# Patient Record
Sex: FEMALE | Race: WHITE | NOT HISPANIC OR LATINO | Employment: UNEMPLOYED | RURAL
[De-identification: names, ages, dates, MRNs, and addresses within clinical notes are randomized per-mention and may not be internally consistent; named-entity substitution may affect disease eponyms.]

---

## 2022-08-23 ENCOUNTER — HOSPITAL ENCOUNTER (EMERGENCY)
Facility: HOSPITAL | Age: 32
Discharge: HOME | End: 2022-08-23

## 2022-08-23 VITALS
TEMPERATURE: 98 F | HEART RATE: 88 BPM | WEIGHT: 199 LBS | HEIGHT: 67 IN | DIASTOLIC BLOOD PRESSURE: 90 MMHG | SYSTOLIC BLOOD PRESSURE: 141 MMHG | RESPIRATION RATE: 18 BRPM | BODY MASS INDEX: 31.23 KG/M2 | OXYGEN SATURATION: 100 %
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
## 2022-08-23 DIAGNOSIS — S90.01XA CONTUSION OF RIGHT ANKLE, INITIAL ENCOUNTER: Primary | ICD-10-CM

## 2022-08-23 PROCEDURE — 99285 EMERGENCY DEPT VISIT HI MDM: CPT | Mod: 25

## 2022-08-23 PROCEDURE — 99284 PR EMERGENCY DEPT VISIT,LEVEL IV: ICD-10-PCS | Mod: ,,, | Performed by: NURSE PRACTITIONER

## 2022-08-23 PROCEDURE — 96372 THER/PROPH/DIAG INJ SC/IM: CPT

## 2022-08-23 PROCEDURE — 99284 EMERGENCY DEPT VISIT MOD MDM: CPT | Mod: ,,, | Performed by: NURSE PRACTITIONER

## 2022-08-23 PROCEDURE — 63600175 PHARM REV CODE 636 W HCPCS: Performed by: NURSE PRACTITIONER

## 2022-08-23 RX ORDER — IBUPROFEN 800 MG/1
800 TABLET ORAL 3 TIMES DAILY
Qty: 20 TABLET | Refills: 0 | Status: SHIPPED | OUTPATIENT
Start: 2022-08-23

## 2022-08-23 RX ORDER — KETOROLAC TROMETHAMINE 30 MG/ML
60 INJECTION, SOLUTION INTRAMUSCULAR; INTRAVENOUS
Status: COMPLETED | OUTPATIENT
Start: 2022-08-23 | End: 2022-08-23

## 2022-08-23 RX ORDER — DEXTROAMPHETAMINE SACCHARATE, AMPHETAMINE ASPARTATE MONOHYDRATE, DEXTROAMPHETAMINE SULFATE AND AMPHETAMINE SULFATE 3.75; 3.75; 3.75; 3.75 MG/1; MG/1; MG/1; MG/1
15 CAPSULE, EXTENDED RELEASE ORAL 2 TIMES DAILY
COMMUNITY

## 2022-08-23 RX ADMIN — KETOROLAC TROMETHAMINE 60 MG: 60 INJECTION, SOLUTION INTRAMUSCULAR at 08:08

## 2022-08-24 NOTE — ED PROVIDER NOTES
Encounter Date: 8/23/2022       History     Chief Complaint   Patient presents with    Fall     Patient presents to ER with complaint of a fall.  Patient states she was going down her steps and slipped.  She states she had on scandals and the steps were wet.  She states she was able to get up but ended up slipping again causing pain to her right ankle.  She states ankle immediately started to swell.  She states her right ankle is now painful to bear weight.  She has history of fracture in same ankle as a child.  Denies head injury or LOC, no other injuries reported.     The history is provided by the patient. No  was used.     Review of patient's allergies indicates:   Allergen Reactions    Diphenhydramine     Demerol [meperidine] Nausea Only     Past Medical History:   Diagnosis Date    ADHD      History reviewed. No pertinent surgical history.  History reviewed. No pertinent family history.  Social History     Tobacco Use    Smoking status: Current Every Day Smoker     Types: Cigarettes    Smokeless tobacco: Never Used   Substance Use Topics    Alcohol use: Yes    Drug use: Yes     Types: Marijuana     Review of Systems   Musculoskeletal: Positive for arthralgias, joint swelling and myalgias.        Right ankle pain and edema after fall.   All other systems reviewed and are negative.      Physical Exam     Initial Vitals [08/23/22 1758]   BP Pulse Resp Temp SpO2   (!) 141/90 88 18 98.3 °F (36.8 °C) 100 %      MAP       --         Physical Exam    Nursing note and vitals reviewed.  Constitutional: She appears well-developed and well-nourished.   HENT:   Head: Normocephalic.   Right Ear: External ear normal.   Left Ear: External ear normal.   Nose: Nose normal.   Mouth/Throat: Oropharynx is clear and moist.   Eyes: Conjunctivae and EOM are normal. Pupils are equal, round, and reactive to light.   Neck: Neck supple.   Normal range of motion.  Cardiovascular: Normal rate, regular rhythm,  normal heart sounds and intact distal pulses.   Pulmonary/Chest: Breath sounds normal.   Abdominal: Abdomen is soft. Bowel sounds are normal.   Musculoskeletal:         General: Tenderness and edema present.      Cervical back: Normal range of motion and neck supple.      Comments: Right ankle edema and limited ROM due to pain. Bruising and deformity noted to right lateral ankle.      Neurological: She is alert and oriented to person, place, and time. She has normal strength. GCS score is 15. GCS eye subscore is 4. GCS verbal subscore is 5. GCS motor subscore is 6.   Skin: Skin is warm and dry. Capillary refill takes less than 2 seconds.   Psychiatric: She has a normal mood and affect. Her behavior is normal. Judgment and thought content normal.         Medical Screening Exam   See Full Note    ED Course   Procedures  Labs Reviewed - No data to display       Imaging Results          CT Ankle (Including Hindfoot) Without Contrast Right (Final result)  Result time 08/23/22 19:40:17    Final result by Aron De Oliveira DO (08/23/22 19:40:17)                 Impression:      No acute fracture or dislocation      Electronically signed by: Aron De Oliveira  Date:    08/23/2022  Time:    19:40             Narrative:    EXAMINATION:  CT ANKLE (INCLUDING HINDFOOT) WITHOUT CONTRAST RIGHT    CLINICAL HISTORY:  Fracture, ankle;    TECHNIQUE:  CT ANKLE (INCLUDING HINDFOOT) WITHOUT CONTRAST RIGHT    COMPARISON:  08/23/2022    FINDINGS:  No acute fracture or dislocation.    Mild degenerative change.  No radiopaque foreign bodies.    Edema lateral to the fibula.                               X-Ray Ankle Complete Right (Final result)  Result time 08/23/22 18:45:34    Final result by Aron De Oliveira DO (08/23/22 18:45:34)                 Impression:      No acute findings      Electronically signed by: Aron De Oliveira  Date:    08/23/2022  Time:    18:45             Narrative:    EXAMINATION:  XR ANKLE COMPLETE 3 VIEW  RIGHT    CLINICAL HISTORY:  Unspecified fall, initial encounter    TECHNIQUE:  XR ANKLE COMPLETE 3 VIEW RIGHT    COMPARISON:  None    FINDINGS:  Questionable bony fragment within the inferior aspect of the lateral clear space, correlate with need for CT.    No joint abnormality.    No radiopaque foreign bodies.    Lateral the swelling of the lateral ankle.                                 Medications   ketorolac injection 60 mg (60 mg Intramuscular Given 8/23/22 2007)                       Clinical Impression:   Final diagnoses:  [W19.XXXA] Fall  [S90.01XA] Contusion of right ankle, initial encounter (Primary)          ED Disposition Condition    Discharge Stable        ED Prescriptions     Medication Sig Dispense Start Date End Date Auth. Provider    ibuprofen (ADVIL,MOTRIN) 800 MG tablet Take 1 tablet (800 mg total) by mouth 3 (three) times daily. 20 tablet 8/23/2022  DEAN Solano        Follow-up Information    None          DEAN Solano  08/23/22 [id number]

## 2022-09-02 ENCOUNTER — TELEPHONE (OUTPATIENT)
Dept: OPHTHALMOLOGY | Facility: CLINIC | Age: 32
End: 2022-09-02

## 2022-09-08 ENCOUNTER — OFFICE VISIT (OUTPATIENT)
Dept: ORTHOPEDICS | Facility: CLINIC | Age: 32
End: 2022-09-08

## 2022-09-08 DIAGNOSIS — M76.70 PERONEAL TENDINITIS, UNSPECIFIED LATERALITY: Primary | ICD-10-CM

## 2022-09-08 DIAGNOSIS — M25.871 ANKLE IMPINGEMENT SYNDROME, RIGHT: ICD-10-CM

## 2022-09-08 DIAGNOSIS — S90.01XA CONTUSION OF RIGHT ANKLE, INITIAL ENCOUNTER: ICD-10-CM

## 2022-09-08 PROCEDURE — 99203 OFFICE O/P NEW LOW 30 MIN: CPT | Mod: ,,, | Performed by: ORTHOPAEDIC SURGERY

## 2022-09-08 PROCEDURE — 99203 PR OFFICE/OUTPT VISIT, NEW, LEVL III, 30-44 MIN: ICD-10-PCS | Mod: ,,, | Performed by: ORTHOPAEDIC SURGERY

## 2022-09-08 NOTE — PROGRESS NOTES
HPI:   Shirley Rose is a pleasant 32 y.o. patient who reports to clinic for evaluation of right ankle injury. She slipped on her steps at home, had right ankle pain. She was seen in the ED, placed in a boot and referred to ortho. The pain is on the lateral side of her ankle.   Injury onset and description: fall  Patient's occupation:   This is not a work related injury.   This injury has been non-responsive to conservative care. The pain is worse with repetitive use, and strenuous activity is very difficult.  her pain improves with rest.  she rates pain as a  4/10on the Visual Analog Scale.        PAST MEDICAL HISTORY:   Past Medical History:   Diagnosis Date    ADHD      PAST SURGICAL HISTORY:   No past surgical history on file.  MEDICATIONS:    Current Outpatient Medications:     dextroamphetamine-amphetamine (ADDERALL XR) 15 MG 24 hr capsule, Take 15 mg by mouth 2 (two) times a day., Disp: , Rfl:     ibuprofen (ADVIL,MOTRIN) 800 MG tablet, Take 1 tablet (800 mg total) by mouth 3 (three) times daily., Disp: 20 tablet, Rfl: 0  ALLERGIES:   Review of patient's allergies indicates:   Allergen Reactions    Diphenhydramine     Demerol [meperidine] Nausea Only     REVIEW OF SYSTEMS:  Constitution: Negative. Negative for chills, fever and night sweats. HENT: Negative for congestion and headaches.  Eyes: Negative for blurred vision, left vision loss and right vision loss. Cardiovascular: Negative for chest pain and syncope. Respiratory: Negative for cough and shortness of breath.  Endocrine: Negative for polydipsia, polyphagia and polyuria. Hematologic/Lymphatic: Negative for bleeding problem. Does not bruise/bleed easily. Skin: Negative for dry skin, itching and rash.   Musculoskeletal: Negative for falls. Positive for hand pain and muscle weakness.     PHYSICAL EXAM:  VITAL SIGNS: LMP 08/16/2022   General: Well-developed well-nourished 32 y.o. femalein no acute distress;Cardiovascular: Regular rhythm by  palpation of distal pulse, normal color and temperature, no concerning varicosities on symptomatic side Lungs: No labored breathing or wheezing appreciated Neuro: Alert and oriented ×3 Psychiatric: well oriented to person, place and time, demonstrates normal mood and affect Skin: No rashes, lesions or ulcers, normal temperature, turgor, and texture on uninvolved extremity    Ortho/SPM Exam  I examined her right ankle today.  She has tenderness to palpation over the course of the peroneal tendons.  No overt subluxation of the peroneal tendons demonstrated today.  Pain with eversion inversion.  Negative anterior drawer neutral and plantar flexion.  Mild tenderness of the hindfoot.  Normal alignment of the hindfoot and forefoot is demonstrated.    IMAGING:  X-Ray Ankle Complete Right    Result Date: 8/23/2022  EXAMINATION: XR ANKLE COMPLETE 3 VIEW RIGHT CLINICAL HISTORY: Unspecified fall, initial encounter TECHNIQUE: XR ANKLE COMPLETE 3 VIEW RIGHT COMPARISON: None FINDINGS: Questionable bony fragment within the inferior aspect of the lateral clear space, correlate with need for CT. No joint abnormality. No radiopaque foreign bodies. Lateral the swelling of the lateral ankle.     No acute findings Electronically signed by: Aron De Oliveira Date:    08/23/2022 Time:    18:45    CT Ankle (Including Hindfoot) Without Contrast Right    Result Date: 8/23/2022  EXAMINATION: CT ANKLE (INCLUDING HINDFOOT) WITHOUT CONTRAST RIGHT CLINICAL HISTORY: Fracture, ankle; TECHNIQUE: CT ANKLE (INCLUDING HINDFOOT) WITHOUT CONTRAST RIGHT COMPARISON: 08/23/2022 FINDINGS: No acute fracture or dislocation. Mild degenerative change.  No radiopaque foreign bodies. Edema lateral to the fibula.     No acute fracture or dislocation Electronically signed by: Aron De Oliveira Date:    08/23/2022 Time:    19:40        ASSESSMENT:      ICD-10-CM ICD-9-CM   1. Peroneal tendinitis, unspecified laterality  M76.70 726.79   2. Ankle impingement syndrome,  right  M25.871 726.79       PLAN:     -Findings and treatment options were discussed with the patient  -All questions answered  She appears to have a retained loose body this is related to a previous injury in her childhood.  The most acute issue is peroneal tendinitis secondary to a bad ankle sprain.  Will treat with a lace-up ankle support.  Understands that if her pain is no better in about 2 months this likely is result of a significant peroneal tendon tear this may require an MRI and further treatment.  DC cam boot continue activity as tolerated    There are no Patient Instructions on file for this visit.  No orders of the defined types were placed in this encounter.    Procedures